# Patient Record
Sex: FEMALE
[De-identification: names, ages, dates, MRNs, and addresses within clinical notes are randomized per-mention and may not be internally consistent; named-entity substitution may affect disease eponyms.]

---

## 2017-07-22 ENCOUNTER — HOSPITAL ENCOUNTER (OUTPATIENT)
Dept: HOSPITAL 14 - H.ER | Age: 62
Setting detail: OBSERVATION
Discharge: HOME | End: 2017-07-22
Attending: EMERGENCY MEDICINE | Admitting: EMERGENCY MEDICINE
Payer: COMMERCIAL

## 2017-07-22 VITALS — TEMPERATURE: 99.7 F | HEART RATE: 77 BPM | SYSTOLIC BLOOD PRESSURE: 128 MMHG | DIASTOLIC BLOOD PRESSURE: 71 MMHG

## 2017-07-22 VITALS — RESPIRATION RATE: 18 BRPM

## 2017-07-22 VITALS — OXYGEN SATURATION: 98 %

## 2017-07-22 DIAGNOSIS — N39.0: Primary | ICD-10-CM

## 2017-07-22 DIAGNOSIS — E11.65: ICD-10-CM

## 2017-07-22 DIAGNOSIS — R10.9: ICD-10-CM

## 2017-07-22 DIAGNOSIS — K59.00: ICD-10-CM

## 2017-07-22 LAB
ALBUMIN/GLOB SERPL: 1.1 {RATIO} (ref 1–2.1)
ALP SERPL-CCNC: 93 U/L (ref 38–126)
ALT SERPL-CCNC: 16 U/L (ref 9–52)
ARTERIAL PATENCY WRIST A: YES
AST SERPL-CCNC: 28 U/L (ref 14–36)
BACTERIA #/AREA URNS HPF: (no result) /[HPF]
BASOPHILS # BLD AUTO: 0.1 K/UL (ref 0–0.2)
BASOPHILS NFR BLD: 1.2 % (ref 0–2)
BILIRUB SERPL-MCNC: 0.7 MG/DL (ref 0.2–1.3)
BILIRUB UR-MCNC: NEGATIVE MG/DL
BUN SERPL-MCNC: 23 MG/DL (ref 7–17)
CALCIUM SERPL-MCNC: 10.3 MG/DL (ref 8.4–10.2)
CHLORIDE SERPL-SCNC: 99 MMOL/L (ref 98–107)
CO2 SERPL-SCNC: 24 MMOL/L (ref 22–30)
COHGB MFR BLD: 0.1 % (ref 0.5–1.5)
COLOR UR: YELLOW
EOSINOPHIL # BLD AUTO: 0.2 K/UL (ref 0–0.7)
EOSINOPHIL NFR BLD: 1.3 % (ref 0–4)
ERYTHROCYTE [DISTWIDTH] IN BLOOD BY AUTOMATED COUNT: 12.3 % (ref 11.5–14.5)
GLOBULIN SER-MCNC: 4 GM/DL (ref 2.2–3.9)
GLUCOSE SERPL-MCNC: 369 MG/DL (ref 65–105)
GLUCOSE UR STRIP-MCNC: >=500 MG/DL
HCO3 BLDA-SCNC: 26 MMOL/L (ref 21–28)
HCT VFR BLD CALC: 41.1 % (ref 34–47)
HHB: 6 % (ref 0–5)
KETONES UR STRIP-MCNC: (no result) MG/DL
LEUKOCYTE ESTERASE UR-ACNC: (no result) LEU/UL
LIPASE SERPL-CCNC: 266 U/L (ref 23–300)
LYMPHOCYTES # BLD AUTO: 2.3 K/UL (ref 1–4.3)
LYMPHOCYTES NFR BLD AUTO: 19.4 % (ref 20–40)
MCH RBC QN AUTO: 27.8 PG (ref 27–31)
MCHC RBC AUTO-ENTMCNC: 33.1 G/DL (ref 33–37)
MCV RBC AUTO: 84 FL (ref 81–99)
METHGB MFR BLD: 0.9 % (ref 0–3)
MONOCYTES # BLD: 0.6 K/UL (ref 0–0.8)
MONOCYTES NFR BLD: 4.9 % (ref 0–10)
NEUTROPHILS # BLD: 8.8 K/UL (ref 1.8–7)
NEUTROPHILS NFR BLD AUTO: 73.2 % (ref 50–75)
NRBC BLD AUTO-RTO: 0 % (ref 0–0)
O2 CAP BLDA-SCNC: 18.8 ML/DL (ref 16–24)
O2 CT BLDA-SCNC: 17.7 ML/DL (ref 15–23)
PH BLDA: 7.43 [PH] (ref 7.35–7.45)
PH UR STRIP: 6 [PH] (ref 5–8)
PLATELET # BLD: 344 K/UL (ref 130–400)
PMV BLD AUTO: 9.5 FL (ref 7.2–11.7)
PO2 BLDA: 73 MM/HG (ref 80–100)
POTASSIUM SERPL-SCNC: 5.5 MMOL/L (ref 3.6–5)
PROT SERPL-MCNC: 8.6 G/DL (ref 6.3–8.2)
PROT UR STRIP-MCNC: 100 MG/DL
RBC # UR STRIP: (no result) /UL
RBC #/AREA URNS HPF: 8 /HPF (ref 0–3)
SAO2 % BLDA: 92.9 % (ref 95–98)
SODIUM SERPL-SCNC: 136 MMOL/L (ref 132–148)
SP GR UR STRIP: 1.02 (ref 1–1.03)
UROBILINOGEN UR-MCNC: (no result) MG/DL (ref 0.2–1)
WBC # BLD AUTO: 12 K/UL (ref 4.8–10.8)
WBC #/AREA URNS HPF: 121 /HPF (ref 0–5)
WBC CLUMPS # UR AUTO: (no result) /HPF

## 2017-07-22 PROCEDURE — 99283 EMERGENCY DEPT VISIT LOW MDM: CPT

## 2017-07-22 PROCEDURE — 80053 COMPREHEN METABOLIC PANEL: CPT

## 2017-07-22 PROCEDURE — 87181 SC STD AGAR DILUTION PER AGT: CPT

## 2017-07-22 PROCEDURE — 87086 URINE CULTURE/COLONY COUNT: CPT

## 2017-07-22 PROCEDURE — 84132 ASSAY OF SERUM POTASSIUM: CPT

## 2017-07-22 PROCEDURE — 85025 COMPLETE CBC W/AUTO DIFF WBC: CPT

## 2017-07-22 PROCEDURE — 82803 BLOOD GASES ANY COMBINATION: CPT

## 2017-07-22 PROCEDURE — 93005 ELECTROCARDIOGRAM TRACING: CPT

## 2017-07-22 PROCEDURE — 74177 CT ABD & PELVIS W/CONTRAST: CPT

## 2017-07-22 PROCEDURE — 82948 REAGENT STRIP/BLOOD GLUCOSE: CPT

## 2017-07-22 PROCEDURE — 83690 ASSAY OF LIPASE: CPT

## 2017-07-22 PROCEDURE — 96374 THER/PROPH/DIAG INJ IV PUSH: CPT

## 2017-07-22 PROCEDURE — 81003 URINALYSIS AUTO W/O SCOPE: CPT

## 2017-07-22 NOTE — ED PDOC
HPI: Abdomen


Time Seen by Provider: 07/22/17 13:55


Chief Complaint (Nursing): Abdominal Pain


Chief Complaint (Provider): ABDOMINAL PAIN


History Per: Patient (60 Y/O FEMALE HERE WITH COMPLAINT OF CONSTIPATION X 4 

DAYS ASSOCIATED WITH PERSISTNET LOWER ABDOMINAL AND BACK PAIN.  NOTES VOMITING 

TODAY. DENIES ANY FEVERS OR CHILLS. NOTES MILD BACK PAIN.)


Quality Of Discomfort: denies: Unable To Describe





Past Medical History


Reviewed: Historical Data, Nursing Documentation, Vital Signs


Vital Signs: 


 Last Vital Signs











Temp  98.4 F   07/22/17 13:40


 


Pulse  78   07/22/17 15:05


 


Resp  18   07/22/17 15:05


 


BP  130/78   07/22/17 15:05


 


Pulse Ox  98   07/22/17 19:49














- Family History


Family History: States: No Known Family Hx





- Home Medications


Home Medications: 


 Ambulatory Orders











 Medication  Instructions  Recorded


 


Cephalexin [Keflex] 500 mg PO QID #28 capsule 07/22/17


 


Docusate Sodium [Colace] 100 mg PO BID #20 capsule 07/22/17














- Allergies


Allergies/Adverse Reactions: 


 Allergies











Allergy/AdvReac Type Severity Reaction Status Date / Time


 


No Known Allergies Allergy   Verified 07/22/17 13:40














Review of Systems


ROS Statement: Except As Marked, All Systems Reviewed And Found Negative


Gastrointestinal: Positive for: Vomiting, Abdominal Pain, Constipation





Physical Exam





- Reviewed


Nursing Documentation Reviewed: Yes


Vital Signs Reviewed: Yes





- Physical Exam


Appears: Positive for: Well, Non-toxic, No Acute Distress


Head Exam: Positive for: ATRAUMATIC, NORMAL INSPECTION, NORMOCEPHALIC


Skin: Positive for: Normal Color, Warm, DRY


Eye Exam: Positive for: EOMI, Normal appearance, PERRL


ENT: Positive for: Normal ENT Inspection


Neck: Positive for: Normal, Painless ROM


Cardiovascular/Chest: Positive for: Regular Rate, Rhythm


Respiratory: Positive for: CNT, Normal Breath Sounds


Gastrointestinal/Abdominal: Positive for: Normal Exam, Bowel Sounds, Soft


Back: Positive for: Normal Inspection


Extremity: Positive for: Normal ROM


Neurologic/Psych: Positive for: Alert, Oriented





- Laboratory Results


Result Diagrams: 


 07/22/17 14:31





 07/22/17 14:31


Urine dip results: Positive for: Leukocyte Esterase, Blood, Nitrate, Glucose.  

Negative for: Ketones, Bilirubin





- ECG


O2 Sat by Pulse Oximetry: 98





- Progress


ED Course And Treament: 





BLOOD GLUCOSE NOTED ABOVE 300





NS 1 LITER WIDE OPEN





FLEET ENEMA GIVEN





PREPPED FOR CT ABD/PELVIS





UTI NOTED





ROCEPHIN 1 GM IV X 1 DOSE





POTASSIUM NOTED ELEVATED WITH HEMOLYSED SPECIMEN


EKG AT 15:28 REVIEWED: nsr 87BPM RBBB NO ACUTE CHANGES NONSPECIFIC CHANGES V2/ 

V3





REPEAT BLOOD GLUCOSE 342.





INSULIN 6 UNITS IV 


NS 1 LITER ORDERED





WILL REPEAT POTASSIUM LEVEL








ED OBSERVATION


Date of observation admission: 07/22/17


Time of observation admission: 19:51





- Observation admission statement


Patient is being placed in observation because:: 





HYPERKALEMIA/HYPERGLYCEMIA





- Goals of Observation


Goals of observation are:: 





IMPROVEMENT OF POTASSIUM/GLUCOSE





- Progress Note


Progress Note: 





07/22/17 19:51





PATIENT HAS BEEN EVALUATED TODAY FOR ABDOMINAL PAIN





CT ABD/PELVIS:WNL





PATIENT NOTES IMPROVEMENT OF SYMTPOMS WITH BOWEL MOVEMENT IN ED.





NOTED TO HAVE ELEVATED 





INSULIN 6 UNITS ORDERED





WILL REPEAT POTASSIUM AS WELL





NS 1 LITER 2ND LITER ORDERED








Disposition





- Clinical Impression


Clinical Impression: 


 UTI (urinary tract infection), Constipation








- Patient ED Disposition


Is Patient to be Admitted: No





- Disposition


Disposition: Transfer of Care


Disposition Time: 20:00


Condition: FAIR


Prescriptions: 


Cephalexin [Keflex] 500 mg PO QID #28 capsule


Docusate Sodium [Colace] 100 mg PO BID #20 capsule


Instructions:  Constipation (DC), Urinary Tract Infection in Women (ED), High 

Fiber Diet (ED), Diabetic Hyperglycemia (ED)


Print Language: Irish


Patient Signed Over To: Yvonne Anderson


Handoff Comments: REPEAT POTASSIUM.  REPEAT BLOOD GLUCOSE

## 2017-07-22 NOTE — ED PDOC
- Laboratory Results


Result Diagrams: 


 07/22/17 14:31





 07/22/17 22:39





- ECG


O2 Sat by Pulse Oximetry: 98


Pulse Ox Interpretation: Normal





Medical Decision Making


Medical Decision Making: 


Case was signed out to writer from [facility] pending repeat fingerstick and 

repeat K.





9:30 pm: glucose POC is 282, down from 369


Repeat K was 1.9 but concerning for lab error.  K was repeated again, noted to 

be normal at 4.7





Patient is aware of all diagnostic testing results, all questions answered.  

She was instructed to take rx meds as directed and follow up with PMD in 2-3 

days. 





Disposition





- Clinical Impression


Clinical Impression: 


 UTI (urinary tract infection), Constipation, Diabetes mellitus with 

hyperglycemia








- POA


Present On Arrival: None





- Disposition


Disposition: Routine/Home


Disposition Time: 21:39


Condition: FAIR





Results





- Lab Results


Lab Results: 














  07/22/17 07/22/17 07/22/17





  14:31 14:31 14:31


 


WBC    12.0 H


 


RBC    4.89


 


Hgb    13.6


 


Hct    41.1


 


MCV    84.0


 


MCH    27.8


 


MCHC    33.1


 


RDW    12.3


 


Plt Count    344


 


MPV    9.5


 


Neut % (Auto)    73.2


 


Lymph % (Auto)    19.4 L


 


Mono % (Auto)    4.9


 


Eos % (Auto)    1.3


 


Baso % (Auto)    1.2


 


Neut #    8.8 H


 


Lymph #    2.3


 


Mono #    0.6


 


Eos #    0.2


 


Baso #    0.1


 


Sodium   136 


 


Potassium   5.5 H 


 


Chloride   99 


 


Carbon Dioxide   24 


 


Anion Gap   19 


 


BUN   23 H 


 


Creatinine   0.7 


 


Est GFR ( Amer)   > 60 


 


Est GFR (Non-Af Amer)   > 60 


 


Random Glucose   369 H 


 


Calcium   10.3 H 


 


Total Bilirubin   0.7 


 


AST   28 


 


ALT   16 


 


Alkaline Phosphatase   93 


 


Total Protein   8.6 H 


 


Albumin   4.5 


 


Globulin   4.0 H 


 


Albumin/Globulin Ratio   1.1 


 


Lipase   266 


 


Urine Color  Yellow  


 


Urine Clarity  Cloudy  


 


Urine pH  6.0  


 


Ur Specific Gravity  1.021  


 


Urine Protein  100  


 


Urine Glucose (UA)  >=500  


 


Urine Ketones  Trace  


 


Urine Blood  Small  


 


Urine Nitrate  Negative  


 


Urine Bilirubin  Negative  


 


Urine Urobilinogen  0.2-1.0  


 


Ur Leukocyte Esterase  Large  


 


Urine RBC (Auto)  8 H  


 


Urine WBC Clumps (Auto)  Occ H  


 


Urine Microscopic WBC  121 H  


 


Ur Squamous Epith Cells  < 1  


 


Urine Bacteria  Occ H  


 


Urine Yeast (Budding)  Rare H

## 2017-07-23 NOTE — CT
PROCEDURE:  CT Abdomen and Pelvis with contrast



HISTORY:

R/O SBO



COMPARISON:

None.



TECHNIQUE:

Contrast dose: 100 cc of Omnipaque 300



Radiation dose:



Total exam DLP = 1623 mGy-cm.



This CT exam was performed using one or more of the following dose 

reduction techniques: Automated exposure control, adjustment of the 

mA and/or kV according to patient size, and/or use of iterative 

reconstruction technique.



FINDINGS:



LOWER THORAX:

Unremarkable. Small hiatal hernia. 



LIVER:

Unremarkable. No gross lesion or ductal dilatation. 



GALLBLADDER AND BILE DUCTS:

Cholecystectomy. 



PANCREAS:

Unremarkable. No gross lesion or ductal dilatation.



SPLEEN:

Unremarkable. 



ADRENALS:

Unremarkable. No mass. 



KIDNEYS AND URETERS:

Unremarkable. No hydronephrosis. No solid mass. 



VASCULATURE:

Unremarkable. No aortic aneurysm. 



BOWEL:

Unremarkable. No obstruction. No gross mural thickening. 



APPENDIX:

Normal appendix. 



PERITONEUM:

Unremarkable. No free fluid. No free air. 



LYMPH NODES:

Unremarkable. No enlarged lymph nodes. 



BLADDER:

Unremarkable. 



REPRODUCTIVE:

Hysterectomy. 



BONES:

No acute fracture. 



OTHER FINDINGS:

None.



IMPRESSION:

Unremarkable contrast enhanced CT of the abdomen and pelvis.

## 2017-07-23 NOTE — CARD
--------------- APPROVED REPORT --------------





EKG Measurement

Heart [id number]

NE 140P31

[id number]

[id number]

[id number]



<Conclusion>

Normal sinus rhythm

Right bundle branch block

Abnormal ECG

## 2019-01-24 ENCOUNTER — HOSPITAL ENCOUNTER (EMERGENCY)
Dept: HOSPITAL 14 - H.ER | Age: 64
Discharge: HOME | End: 2019-01-24
Payer: COMMERCIAL

## 2019-01-24 VITALS — SYSTOLIC BLOOD PRESSURE: 132 MMHG | RESPIRATION RATE: 19 BRPM | HEART RATE: 62 BPM | DIASTOLIC BLOOD PRESSURE: 69 MMHG

## 2019-01-24 VITALS — OXYGEN SATURATION: 99 % | TEMPERATURE: 98.1 F
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
## 2019-01-24 DIAGNOSIS — S43.401A: Primary | ICD-10-CM
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
## 2019-01-24 DIAGNOSIS — I10: ICD-10-CM

## 2019-01-24 LAB
ALBUMIN SERPL-MCNC: 4 G/DL (ref 3.5–5)
ALBUMIN/GLOB SERPL: 1.1 {RATIO} (ref 1–2.1)
ALT SERPL-CCNC: 15 U/L (ref 9–52)
AST SERPL-CCNC: 16 U/L (ref 14–36)
BASOPHILS # BLD AUTO: 0 K/UL (ref 0–0.2)
BASOPHILS NFR BLD: 0.3 % (ref 0–2)
BUN SERPL-MCNC: 19 MG/DL (ref 7–17)
CALCIUM SERPL-MCNC: 9.3 MG/DL (ref 8.4–10.2)
EOSINOPHIL # BLD AUTO: 0.2 K/UL (ref 0–0.7)
EOSINOPHIL NFR BLD: 2.4 % (ref 0–4)
ERYTHROCYTE [DISTWIDTH] IN BLOOD BY AUTOMATED COUNT: 13.3 % (ref 11.5–14.5)
GFR NON-AFRICAN AMERICAN: > 60
HGB BLD-MCNC: 12.7 G/DL (ref 12–16)
LYMPHOCYTES # BLD AUTO: 3.1 K/UL (ref 1–4.3)
LYMPHOCYTES NFR BLD AUTO: 35.7 % (ref 20–40)
MCH RBC QN AUTO: 28 PG (ref 27–31)
MCHC RBC AUTO-ENTMCNC: 33.2 G/DL (ref 33–37)
MCV RBC AUTO: 84.5 FL (ref 81–99)
MONOCYTES # BLD: 0.7 K/UL (ref 0–0.8)
MONOCYTES NFR BLD: 7.8 % (ref 0–10)
NEUTROPHILS # BLD: 4.6 K/UL (ref 1.8–7)
NEUTROPHILS NFR BLD AUTO: 53.8 % (ref 50–75)
NRBC BLD AUTO-RTO: 0.5 % (ref 0–0)
PLATELET # BLD: 366 K/UL (ref 130–400)
PMV BLD AUTO: 8.4 FL (ref 7.2–11.7)
RBC # BLD AUTO: 4.53 MIL/UL (ref 3.8–5.2)
WBC # BLD AUTO: 8.5 K/UL (ref 4.8–10.8)

## 2019-01-24 PROCEDURE — 73030 X-RAY EXAM OF SHOULDER: CPT

## 2019-01-24 PROCEDURE — 80053 COMPREHEN METABOLIC PANEL: CPT

## 2019-01-24 PROCEDURE — 85025 COMPLETE CBC W/AUTO DIFF WBC: CPT

## 2019-01-24 PROCEDURE — 99285 EMERGENCY DEPT VISIT HI MDM: CPT

## 2019-01-24 PROCEDURE — 96372 THER/PROPH/DIAG INJ SC/IM: CPT

## 2019-01-24 PROCEDURE — 82948 REAGENT STRIP/BLOOD GLUCOSE: CPT

## 2019-01-24 NOTE — ED PDOC
Upper Extremity Pain/Injury


Time Seen by Provider: 01/24/19 09:49


Chief Complaint (Nursing): Headache


History Per: Patient


Onset/Duration Of Symptoms: Days (4)


Current Symptoms Are (Timing): Still Present


Quality: Aching


Severity: Moderate


Exacerbating Factor(s): Movement


Additional Complaint(s): 





Slipped and fell on ice last Sunday with injury to right shoulder, head and 

neck. No LOC. Seen in ED in [facility] this occurred. CT head and neck 

neg as per discharge instructions given to pt. Xray right shoulder also neg. Pt 

discharge with Dx of contusion. States continues to have pain right shoulder.





Past Medical History


Vital Signs: 





                                Last Vital Signs











Temp  98.1 F   01/24/19 09:34


 


Pulse  75   01/24/19 09:34


 


Resp  18   01/24/19 09:34


 


BP  142/80   01/24/19 09:34


 


Pulse Ox  99   01/24/19 09:34














- Medical History


PMH: HTN





- Surgical History


Surgical History: Cholecystectomy





- Family History


Family History: States: Unknown Family Hx





- Home Medications


Home Medications: 


                                Ambulatory Orders











 Medication  Instructions  Recorded


 


Cephalexin [Keflex] 500 mg PO QID #28 capsule 07/22/17


 


Docusate Sodium [Colace] 100 mg PO BID #20 capsule 07/22/17


 


traMADol [Ultram] 50 mg PO Q8 #10 tab 01/24/19














- Allergies


Allergies/Adverse Reactions: 


                                    Allergies











Allergy/AdvReac Type Severity Reaction Status Date / Time


 


No Known Allergies Allergy   Verified 07/22/17 13:40














Review of Systems


Musculoskeletal: Positive for: Shoulder Pain


Neurological: Negative for: Headache, Dizziness





Physical Exam





- Physical Exam


Appears: Positive for: Non-toxic, Uncomfortable


Head Exam: Positive for: ATRAUMATIC, NORMAL INSPECTION, NORMOCEPHALIC


Skin: Positive for: Normal Color, Warm, DRY


Neck: Positive for: Normal, Painless ROM, Supple


Cardiovascular/Chest: Positive for: Regular Rate, Rhythm


Respiratory: Positive for: CNT, Normal Breath Sounds


Pulses-Radial (L): 2+


Pulses-Radial (R): 2+


Extremity: Positive for: Other (Right shoulder, No deformity or swelling No 

ecchymosis. Tenderness ant deltoid area. ROM limited by pain.)


Neurologic/Psych: Positive for: Alert, Oriented.  Negative for: Motor/Sensory 

Deficits





- Laboratory Results


Result Diagrams: 


                                 01/24/19 10:30





                                 01/24/19 10:30





- ECG


O2 Sat by Pulse Oximetry: 99





Medical Decision Making


Medical Decision Making: 





Will repeat Xray right shoulder due to continued pain despite immobilization and

 pain meds.





Disposition





- Clinical Impression


Clinical Impression: 


 Shoulder sprain








- Patient ED Disposition


Is Patient to be Admitted: No


Counseled Patient/Family Regarding: Studies Performed, Diagnosis, Need For 

Followup, Rx Given





- Disposition


Referrals: 


Tez Monroe III, MD [Staff Provider] - 


Disposition: Routine/Home


Disposition Time: 13:48


Condition: FAIR


Prescriptions: 


traMADol [Ultram] 50 mg PO Q8 #10 tab


Instructions:  Shoulder Sprain


Forms:  [facility] (English)


Print Language: Portuguese

## 2019-01-24 NOTE — RAD
Date of service: 



01/24/2019



PROCEDURE:  Radiographs of the Right Shoulder



HISTORY:

trauma







COMPARISON:

No prior.



FINDINGS:



BONES:

No acute fracture.



JOINTS:

Glenohumeral and acromioclavicular joint degenerative changes.



SOFT TISSUES:

Normal.



OTHER FINDINGS:

None. 



IMPRESSION:

No demonstrated fracture or dislocation.  Degenerative changes.